# Patient Record
Sex: FEMALE | Race: OTHER | HISPANIC OR LATINO
[De-identification: names, ages, dates, MRNs, and addresses within clinical notes are randomized per-mention and may not be internally consistent; named-entity substitution may affect disease eponyms.]

---

## 2021-03-09 ENCOUNTER — APPOINTMENT (OUTPATIENT)
Dept: MAMMOGRAPHY | Facility: CLINIC | Age: 41
End: 2021-03-09
Payer: MEDICAID

## 2021-03-09 PROCEDURE — 77063 BREAST TOMOSYNTHESIS BI: CPT

## 2021-03-09 PROCEDURE — 77067 SCR MAMMO BI INCL CAD: CPT

## 2022-07-06 PROBLEM — Z00.00 ENCOUNTER FOR PREVENTIVE HEALTH EXAMINATION: Status: ACTIVE | Noted: 2022-07-06

## 2022-07-11 ENCOUNTER — NON-APPOINTMENT (OUTPATIENT)
Age: 42
End: 2022-07-11

## 2022-07-11 ENCOUNTER — APPOINTMENT (OUTPATIENT)
Dept: CARDIOLOGY | Facility: CLINIC | Age: 42
End: 2022-07-11

## 2022-07-11 VITALS
SYSTOLIC BLOOD PRESSURE: 152 MMHG | HEART RATE: 60 BPM | WEIGHT: 125 LBS | OXYGEN SATURATION: 99 % | BODY MASS INDEX: 22.15 KG/M2 | HEIGHT: 63 IN | DIASTOLIC BLOOD PRESSURE: 90 MMHG

## 2022-07-11 VITALS — SYSTOLIC BLOOD PRESSURE: 162 MMHG | DIASTOLIC BLOOD PRESSURE: 90 MMHG

## 2022-07-11 DIAGNOSIS — Z78.9 OTHER SPECIFIED HEALTH STATUS: ICD-10-CM

## 2022-07-11 DIAGNOSIS — Z82.3 FAMILY HISTORY OF STROKE: ICD-10-CM

## 2022-07-11 DIAGNOSIS — Z82.49 FAMILY HISTORY OF ISCHEMIC HEART DISEASE AND OTHER DISEASES OF THE CIRCULATORY SYSTEM: ICD-10-CM

## 2022-07-11 DIAGNOSIS — Z87.59 PERSONAL HISTORY OF OTHER COMPLICATIONS OF PREGNANCY, CHILDBIRTH AND THE PUERPERIUM: ICD-10-CM

## 2022-07-11 PROCEDURE — 93000 ELECTROCARDIOGRAM COMPLETE: CPT

## 2022-07-11 PROCEDURE — 99204 OFFICE O/P NEW MOD 45 MIN: CPT | Mod: 25

## 2022-07-11 NOTE — PHYSICAL EXAM
[Normal] : moves all extremities, no focal deficits, normal speech [de-identified] : No carotid bruits auscultated bilaterally

## 2022-07-11 NOTE — DISCUSSION/SUMMARY
[FreeTextEntry1] : 1. HTN: recommend starting losartan-HCTZ 50/12.5mg daily. Recommend low salt diet. Goal BP less than 130/80. Patient with IUD for birth control. Discussed the teratogenic effect of this medication and importance of continued birth control use. \par \par 2. Given memory lapses recommend labs, adequate BP control. Recommend echocardiogram. Consider neurology evaluation.\par \par Follow up in 1 month.
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
## 2022-07-19 ENCOUNTER — APPOINTMENT (OUTPATIENT)
Dept: CARDIOLOGY | Facility: CLINIC | Age: 42
End: 2022-07-19

## 2022-07-19 PROCEDURE — 93306 TTE W/DOPPLER COMPLETE: CPT

## 2022-07-25 ENCOUNTER — EMERGENCY (EMERGENCY)
Facility: HOSPITAL | Age: 42
LOS: 1 days | Discharge: HOME | End: 2022-07-25
Admitting: EMERGENCY MEDICINE

## 2022-07-25 DIAGNOSIS — R07.89 OTHER CHEST PAIN: ICD-10-CM

## 2022-07-25 DIAGNOSIS — I10 ESSENTIAL (PRIMARY) HYPERTENSION: ICD-10-CM

## 2022-07-25 PROCEDURE — 93010 ELECTROCARDIOGRAM REPORT: CPT

## 2022-07-25 PROCEDURE — 99285 EMERGENCY DEPT VISIT HI MDM: CPT

## 2022-07-26 PROCEDURE — 71045 X-RAY EXAM CHEST 1 VIEW: CPT | Mod: 26

## 2022-10-10 ENCOUNTER — APPOINTMENT (OUTPATIENT)
Dept: CARDIOLOGY | Facility: CLINIC | Age: 42
End: 2022-10-10

## 2022-10-10 VITALS
SYSTOLIC BLOOD PRESSURE: 132 MMHG | HEIGHT: 63 IN | WEIGHT: 121 LBS | OXYGEN SATURATION: 98 % | TEMPERATURE: 97.1 F | BODY MASS INDEX: 21.44 KG/M2 | DIASTOLIC BLOOD PRESSURE: 88 MMHG | HEART RATE: 60 BPM

## 2022-10-10 DIAGNOSIS — Z87.898 PERSONAL HISTORY OF OTHER SPECIFIED CONDITIONS: ICD-10-CM

## 2022-10-10 DIAGNOSIS — Z13.6 ENCOUNTER FOR SCREENING FOR CARDIOVASCULAR DISORDERS: ICD-10-CM

## 2022-10-10 PROCEDURE — 99214 OFFICE O/P EST MOD 30 MIN: CPT

## 2022-10-10 NOTE — HISTORY OF PRESENT ILLNESS
[FreeTextEntry1] : Historical Perspective:\par 42 year old female with PMHx of of right ligation/division of greater saphenous vein on 8/7/2020 at [facility] and following the procedure she was found to have a short-segment of non-occlusive acute thrombus in a tributary to the basilic vein at he antecubital fossa, also history of pre-eclampsia with 2 of her 3 pregnancies presents with elevated BP readings for the last few months. She is also having memory lapses with names and tasks, which is similar to when she had pre-eclampsia. During her pregnancies she was treated with labetalol and carvedilol. She states she hasn't been on regularly antihypertensive medications since her last pregnancy two years ago. Also never had labs done in last two years. 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 \par \par There is no history of MI, CVA, CHF, or previous coronary intervention.\par \par She currently has an IUD for birth control.\par \par Current Health Status:\par Patient with no chest pain, SOB, or palpitations. Patient states that July 26, 2022, she woke up in middle of night with chest pressure and dyspnea. Went to [facility] [place] [place]  and testing was within normal limits. No recurrent episodes. Patient exercises with no exertional complaints. Remains compliant with his medications and reports no adverse effects.\par

## 2022-10-10 NOTE — PHYSICAL EXAM
[Normal] : moves all extremities, no focal deficits, normal speech [de-identified] : No carotid bruits auscultated bilaterally

## 2022-10-10 NOTE — CARDIOLOGY SUMMARY
[de-identified] : 7/11/2022, sinus bradycardia [de-identified] : 7/19/2022, LV EF 60-65%, mild MR, normal LV diastolic function, mild-moderate TR with estimated PASP of 31mmHg.

## 2022-10-10 NOTE — DISCUSSION/SUMMARY
[FreeTextEntry1] : 1. HTN: improved but consistently above 130/80. Recommend increasing losartan-HCTZ 50/12.5mg daily-->100/25mg daily.  Recommend low salt diet. Goal BP less than 130/80. Patient with IUD for birth control. Discussed the teratogenic effect of this medication and importance of continued birth control use. \par \par 2. Given memory lapses: normal labs, improved with adequate BP control. No significant findings on echocardiogram from 7/19/2022.  Consider neurology evaluation.\par \par 3. Chest Pain: [place] [facility] [place]  and testing was within normal limits. No recurrent episodes. Patient exercises with no exertional complaints. Echocardiogram done 7/19/2022, revealed normal LV systolic function and no significant valvular disease. \par \par Follow up in 6 months.

## 2023-02-01 ENCOUNTER — NON-APPOINTMENT (OUTPATIENT)
Age: 43
End: 2023-02-01

## 2023-02-02 ENCOUNTER — NON-APPOINTMENT (OUTPATIENT)
Age: 43
End: 2023-02-02

## 2023-02-02 ENCOUNTER — APPOINTMENT (OUTPATIENT)
Dept: CARDIOLOGY | Facility: CLINIC | Age: 43
End: 2023-02-02
Payer: MEDICAID

## 2023-02-02 VITALS
OXYGEN SATURATION: 97 % | WEIGHT: 125 LBS | HEART RATE: 67 BPM | DIASTOLIC BLOOD PRESSURE: 60 MMHG | BODY MASS INDEX: 22.15 KG/M2 | SYSTOLIC BLOOD PRESSURE: 124 MMHG | HEIGHT: 63 IN

## 2023-02-02 DIAGNOSIS — I10 ESSENTIAL (PRIMARY) HYPERTENSION: ICD-10-CM

## 2023-02-02 DIAGNOSIS — R00.2 PALPITATIONS: ICD-10-CM

## 2023-02-02 DIAGNOSIS — R07.9 CHEST PAIN, UNSPECIFIED: ICD-10-CM

## 2023-02-02 PROCEDURE — 99214 OFFICE O/P EST MOD 30 MIN: CPT | Mod: 25

## 2023-02-02 PROCEDURE — 93000 ELECTROCARDIOGRAM COMPLETE: CPT

## 2023-02-02 RX ORDER — CEPHALEXIN 500 MG/1
500 CAPSULE ORAL
Qty: 28 | Refills: 0 | Status: DISCONTINUED | COMMUNITY
Start: 2022-06-09 | End: 2023-02-02

## 2023-02-02 NOTE — HISTORY OF PRESENT ILLNESS
[FreeTextEntry1] : Historical Perspective:\par 43 year old female with PMHx of of right ligation/division of greater saphenous vein on 8/7/2020 at [facility] and following the procedure she was found to have a short-segment of non-occlusive acute thrombus in a tributary to the basilic vein at he antecubital fossa, also history of pre-eclampsia with 2 of her 3 pregnancies presents with elevated BP readings for the last few months. She is also having memory lapses with names and tasks, which is similar to when she had pre-eclampsia. During her pregnancies she was treated with labetalol and carvedilol. She states she hasn't been on regularly antihypertensive medications since her last pregnancy two years ago. Also never had labs done in last two years. 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 At the end of the day she gets headaches and her BP is higher in the 150s systolic. Otherwise her BP runs normal. Tolerating medication well with no adverse effects.

## 2023-02-02 NOTE — PHYSICAL EXAM
[Normal] : moves all extremities, no focal deficits, normal speech [de-identified] : No carotid bruits auscultated bilaterally

## 2023-02-02 NOTE — CARDIOLOGY SUMMARY
[de-identified] : 2/2/2023, sinus bradycardia [de-identified] : 7/19/2022, LV EF 60-65%, mild MR, normal LV diastolic function, mild-moderate TR with estimated PASP of 31mmHg.

## 2023-02-02 NOTE — DISCUSSION/SUMMARY
[FreeTextEntry1] : 1. HTN: Continue losartan 100/25mg daily.  Recommend low salt diet. Goal BP less than 130/80. Patient with IUD for birth control. Discussed the teratogenic effect of this medication and importance of continued birth control use. \par \par 2. Given memory lapses: normal labs, improved with adequate BP control. No significant findings on echocardiogram from 7/19/2022.  Consider neurology evaluation.\par \par 3. Chest Pain: Went to [facility] [place]  and testing was within normal limits. No recurrent episodes. Patient exercises with no exertional complaints. Echocardiogram done 7/19/2022, revealed normal LV systolic function and no significant valvular disease. \par \par Follow up in 6 months. [EKG obtained to assist in diagnosis and management of assessed problem(s)] : EKG obtained to assist in diagnosis and management of assessed problem(s)

## 2023-04-17 ENCOUNTER — APPOINTMENT (OUTPATIENT)
Dept: CARDIOLOGY | Facility: CLINIC | Age: 43
End: 2023-04-17

## 2023-08-10 ENCOUNTER — APPOINTMENT (OUTPATIENT)
Dept: CARDIOLOGY | Facility: CLINIC | Age: 43
End: 2023-08-10

## 2023-12-06 ENCOUNTER — APPOINTMENT (OUTPATIENT)
Dept: ORTHOPEDIC SURGERY | Facility: CLINIC | Age: 43
End: 2023-12-06
Payer: COMMERCIAL

## 2023-12-06 PROCEDURE — 28470 CLTX METATARSAL FX WO MNP EA: CPT

## 2023-12-06 PROCEDURE — L4387: CPT | Mod: RT

## 2023-12-06 PROCEDURE — 99204 OFFICE O/P NEW MOD 45 MIN: CPT

## 2023-12-06 PROCEDURE — 73630 X-RAY EXAM OF FOOT: CPT | Mod: RT

## 2023-12-06 RX ORDER — KETOROLAC TROMETHAMINE 10 MG/1
10 TABLET, FILM COATED ORAL EVERY 6 HOURS
Qty: 20 | Refills: 0 | Status: ACTIVE | COMMUNITY
Start: 2023-12-06 | End: 1900-01-01

## 2023-12-21 ENCOUNTER — APPOINTMENT (OUTPATIENT)
Dept: ORTHOPEDIC SURGERY | Facility: CLINIC | Age: 43
End: 2023-12-21
Payer: COMMERCIAL

## 2023-12-21 PROCEDURE — 99024 POSTOP FOLLOW-UP VISIT: CPT

## 2023-12-21 PROCEDURE — 73630 X-RAY EXAM OF FOOT: CPT | Mod: RT
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
## 2023-12-21 NOTE — PHYSICAL EXAM
[Right] : right foot [de-identified] :  Constitutional: The patient appears well developed, well nourished. Examination of patients ability to communicate functionally was normal.       Neurologic: Coordination is normal. Alert and oriented to time, place and person. No evidence of mood disorder, calm affect.       RIGHT      ANKLE/FOOT: Inspection of the ankle, foot and lower leg is as follows: MINIMALfoot swelling. NO  ecchymosis about foot. no abrasions or lacerations, no erythema and no swelling of calf       Palpation of the ankle, foot and lower leg is as follows: NO Tenderness at lateral ligaments. Metatarsal tenderness at 3rd metatarsal(s). No tenderness at calf, lateral malleolus and medial malleolus.       Range of motion of the ankle, foot and lower leg is as follows in degrees:   Dorsiflexion:  -20     Plantar flexion: 30           Vascular testing of the ankle, foot and lower leg are as follows: Dorsalis Pedis (DP) Pulse is 2+. Posterior Tibialis (PT) Pulse is 2+. Varicosities are not present.       Sensation testing of the ankle, foot and lower leg are as follows: Deep Peroneal Nerve Sensation is normal. Lateral Plantar Nerve Sensation is normal. Medial Plantar Nerve Sensation is normal. Superficial Peroneal Nerve Sensation is normal. Sural Nerve Sensation is normal. Saphenous Nerve Sensation is normal.     [de-identified] : 3rd MT frx in good position healing.

## 2023-12-21 NOTE — DISCUSSION/SUMMARY
[de-identified] : She will continue the boot for 4 more weeks and f/u in 4-6 weeks for repeat eval.

## 2024-02-01 ENCOUNTER — APPOINTMENT (OUTPATIENT)
Dept: ORTHOPEDIC SURGERY | Facility: CLINIC | Age: 44
End: 2024-02-01
Payer: COMMERCIAL

## 2024-02-01 DIAGNOSIS — S92.331A DISPLACED FRACTURE OF THIRD METATARSAL BONE, RIGHT FOOT, INITIAL ENCOUNTER FOR CLOSED FRACTURE: ICD-10-CM

## 2024-02-01 PROCEDURE — 99024 POSTOP FOLLOW-UP VISIT: CPT

## 2024-02-01 PROCEDURE — 73630 X-RAY EXAM OF FOOT: CPT | Mod: RT

## 2024-02-01 NOTE — PHYSICAL EXAM
[de-identified] :  Constitutional: The patient appears well developed, well nourished. Examination of patients ability to communicate functionally was normal.       Neurologic: Coordination is normal. Alert and oriented to time, place and person. No evidence of mood disorder, calm affect.       RIGHT      ANKLE/FOOT: Inspection of the ankle, foot and lower leg is as follows: MINIMALfoot swelling. NO  ecchymosis about foot. no abrasions or lacerations, no erythema and no swelling of calf       Palpation of the ankle, foot and lower leg is as follows: NO Tenderness at lateral ligaments. Metatarsal tenderness at 3rd metatarsal(s) MILD . No tenderness at calf, lateral malleolus and medial malleolus.       Range of motion of the ankle, foot and lower leg is as follows in degrees:   Dorsiflexion:  -20     Plantar flexion: 30           Vascular testing of the ankle, foot and lower leg are as follows: Dorsalis Pedis (DP) Pulse is 2+. Posterior Tibialis (PT) Pulse is 2+. Varicosities are not present.       Sensation testing of the ankle, foot and lower leg are as follows: Deep Peroneal Nerve Sensation is normal. Lateral Plantar Nerve Sensation is normal. Medial Plantar Nerve Sensation is normal. Superficial Peroneal Nerve Sensation is normal. Sural Nerve Sensation is normal. Saphenous Nerve Sensation is normal.     [Right] : right foot [The fracture is in acceptable alignment. There is progression in healing seen] : The fracture is in acceptable alignment. There is progression in healing seen [de-identified] : fracture is healing well.
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
## 2024-03-01 ENCOUNTER — NON-APPOINTMENT (OUTPATIENT)
Age: 44
End: 2024-03-01

## 2024-04-04 ENCOUNTER — APPOINTMENT (OUTPATIENT)
Dept: ORTHOPEDIC SURGERY | Facility: CLINIC | Age: 44
End: 2024-04-04

## 2024-05-21 RX ORDER — LOSARTAN POTASSIUM AND HYDROCHLOROTHIAZIDE 25; 100 MG/1; MG/1
100-25 TABLET ORAL DAILY
Qty: 30 | Refills: 0 | Status: ACTIVE | COMMUNITY
Start: 2022-07-11 | End: 1900-01-01

## 2025-03-05 ENCOUNTER — NON-APPOINTMENT (OUTPATIENT)
Age: 45
End: 2025-03-05

## 2025-03-07 ENCOUNTER — APPOINTMENT (OUTPATIENT)
Dept: CARDIOLOGY | Facility: CLINIC | Age: 45
End: 2025-03-07
Payer: SELF-PAY

## 2025-03-07 ENCOUNTER — NON-APPOINTMENT (OUTPATIENT)
Age: 45
End: 2025-03-07

## 2025-03-07 VITALS
OXYGEN SATURATION: 97 % | HEIGHT: 63 IN | DIASTOLIC BLOOD PRESSURE: 82 MMHG | HEART RATE: 60 BPM | SYSTOLIC BLOOD PRESSURE: 122 MMHG | BODY MASS INDEX: 22.86 KG/M2 | WEIGHT: 129 LBS

## 2025-03-07 DIAGNOSIS — I10 ESSENTIAL (PRIMARY) HYPERTENSION: ICD-10-CM

## 2025-03-07 DIAGNOSIS — Z13.6 ENCOUNTER FOR SCREENING FOR CARDIOVASCULAR DISORDERS: ICD-10-CM

## 2025-03-07 PROCEDURE — 93000 ELECTROCARDIOGRAM COMPLETE: CPT

## 2025-03-07 PROCEDURE — 99214 OFFICE O/P EST MOD 30 MIN: CPT
